# Patient Record
Sex: FEMALE | Race: WHITE
[De-identification: names, ages, dates, MRNs, and addresses within clinical notes are randomized per-mention and may not be internally consistent; named-entity substitution may affect disease eponyms.]

---

## 2021-01-21 PROBLEM — Z00.00 ENCOUNTER FOR PREVENTIVE HEALTH EXAMINATION: Status: ACTIVE | Noted: 2021-01-21

## 2021-01-22 ENCOUNTER — APPOINTMENT (OUTPATIENT)
Dept: ORTHOPEDIC SURGERY | Facility: CLINIC | Age: 26
End: 2021-01-22
Payer: COMMERCIAL

## 2021-01-22 VITALS
SYSTOLIC BLOOD PRESSURE: 119 MMHG | BODY MASS INDEX: 22.5 KG/M2 | HEIGHT: 63 IN | WEIGHT: 127 LBS | DIASTOLIC BLOOD PRESSURE: 77 MMHG | HEART RATE: 82 BPM

## 2021-01-22 DIAGNOSIS — Z78.9 OTHER SPECIFIED HEALTH STATUS: ICD-10-CM

## 2021-01-22 DIAGNOSIS — R29.898 OTHER SYMPTOMS AND SIGNS INVOLVING THE MUSCULOSKELETAL SYSTEM: ICD-10-CM

## 2021-01-22 DIAGNOSIS — M77.11 LATERAL EPICONDYLITIS, RIGHT ELBOW: ICD-10-CM

## 2021-01-22 PROCEDURE — 99204 OFFICE O/P NEW MOD 45 MIN: CPT

## 2021-01-22 PROCEDURE — 99072 ADDL SUPL MATRL&STAF TM PHE: CPT

## 2021-01-22 PROCEDURE — 73130 X-RAY EXAM OF HAND: CPT | Mod: RT

## 2021-01-22 PROCEDURE — 73080 X-RAY EXAM OF ELBOW: CPT | Mod: RT

## 2021-01-22 RX ORDER — CYCLOBENZAPRINE HYDROCHLORIDE 7.5 MG/1
TABLET, FILM COATED ORAL
Refills: 0 | Status: ACTIVE | COMMUNITY

## 2021-01-22 RX ORDER — MELOXICAM 15 MG/1
15 TABLET ORAL
Qty: 14 | Refills: 1 | Status: ACTIVE | COMMUNITY
Start: 2021-01-22 | End: 1900-01-01

## 2021-01-22 NOTE — PHYSICAL EXAM
[de-identified] : Right elbow exam\par \par Skin: Clean, dry, intact. No ecchymosis. No swelling. No palpable joint effusion.\par ROM: RIGHT full flexion and extension, full supination/pronation.  LEFT full flexion and extension full supination/pronation.\par Painful ROM: None\par Tenderness: No medial epicondyle pain. Moderate lateral epicondyle pain. No olecranon pain. No pain at radial head.\par Strength: 5/5 elbow flexion, 5/5 elbow extension, 5/5 supination, 5/5 pronation\par Stability: Stable to vaus/valgus stress\par Vasc: 2+ radial pulse, <2s cap refill\par Sensation: In tact to light touch throughout\par Neuro: Negative tinels at ulnar canal, AIN/PIN/Ulnar nerve in tact to motor/sensation.\par \par Right hand exam\par \par Skin: Clean, dry, intact. No ecchymosis. No swelling. No palpable deformity. No crepitus\par ROM: Normal range of motion of all digits. Normal cascade/no rotational deformity.\par Painful ROM: None\par Tenderness: No pain at the scapholunate interval. No snuffbox pain. No ttp at the distal radius, distal ulna, or the DRUJ.  Mild tenderness at the A1 pulley of the right index finger.\par Strength: 5/5  5/5 wrist flexion, 5/5 wrist extension, 5/5 supination, 5/5 pronation\par Stability: No instability\par Vasc: 2+ radial pulse, <2s cap refill throughout\par Sensation: In tact to light touch throughout\par Neuro: Negative tinels over median nerve, AIN/PIN/Ulnar nerve in tact to motor/sensation.\par \par  [de-identified] : 3 views of the right hand taken today reviewed, are within normal, 3 views of the right elbow taken today reviewed with the patient detail, reveal no bony abnormality, no fractures. limits with no bony abnormalities, no fractures.

## 2021-01-22 NOTE — ASSESSMENT
[FreeTextEntry1] : 25-year-old female with pain at the dorsoradial aspect of the right forearm as well as at the A1 pulley of the right index finger and base of the right thumb. Imaging and exam today her arm benign, her symptoms are mostly related to activity consistent with the forearm strain/mild hand tendinitis. I recommend conservative treatment at this time with a course of physical therapy as well as anti-inflammatory medication. She will follow up in 4 weeks if she's not begin to see improvement in her symptoms.

## 2021-01-22 NOTE — HISTORY OF PRESENT ILLNESS
[FreeTextEntry1] : 25 year old female presents for evaluation of right hand and right elbow pain present for about 2 years. She denies any specific injury.  She notes she initially presented with a tremor in her right finger, and was treated conservatively by a chiropractor. She notes after manipulation over one year ago, she developed worsening pain localized to the lateral aspect of her elbow with radiation to the forearm. he also reports some radiation of pain to the operating arm, and describes this as nerve pain. She also notes worsening pain along the A1 pulley of her right index finger. She denies triggering. She notes she has been taking cyclobenzaprine, as she was told the symptoms were related to her neck initially, and notes some improvement with use of the medication. She knows she has difficulty and pain with any repetitive activity, with relief of her symptoms with rest.

## 2021-02-08 ENCOUNTER — APPOINTMENT (OUTPATIENT)
Dept: PHYSICAL MEDICINE AND REHAB | Facility: CLINIC | Age: 26
End: 2021-02-08
Payer: COMMERCIAL

## 2021-02-08 VITALS
HEIGHT: 63 IN | HEART RATE: 108 BPM | DIASTOLIC BLOOD PRESSURE: 82 MMHG | WEIGHT: 127 LBS | BODY MASS INDEX: 22.5 KG/M2 | SYSTOLIC BLOOD PRESSURE: 121 MMHG | TEMPERATURE: 97.2 F

## 2021-02-08 DIAGNOSIS — M79.641 PAIN IN RIGHT HAND: ICD-10-CM

## 2021-02-08 PROCEDURE — 95885 MUSC TST DONE W/NERV TST LIM: CPT | Mod: RT

## 2021-02-08 PROCEDURE — 95908 NRV CNDJ TST 3-4 STUDIES: CPT

## 2021-02-08 PROCEDURE — 99072 ADDL SUPL MATRL&STAF TM PHE: CPT

## 2021-02-24 ENCOUNTER — APPOINTMENT (OUTPATIENT)
Dept: PHYSICAL MEDICINE AND REHAB | Facility: CLINIC | Age: 26
End: 2021-02-24

## 2021-02-24 VITALS
SYSTOLIC BLOOD PRESSURE: 125 MMHG | BODY MASS INDEX: 22.5 KG/M2 | HEIGHT: 63 IN | DIASTOLIC BLOOD PRESSURE: 86 MMHG | TEMPERATURE: 98.2 F | WEIGHT: 127 LBS | HEART RATE: 99 BPM

## 2021-03-08 ENCOUNTER — APPOINTMENT (OUTPATIENT)
Dept: PHYSICAL MEDICINE AND REHAB | Facility: CLINIC | Age: 26
End: 2021-03-08
Payer: COMMERCIAL

## 2021-03-08 DIAGNOSIS — M77.01 MEDIAL EPICONDYLITIS, RIGHT ELBOW: ICD-10-CM

## 2021-03-08 DIAGNOSIS — G56.01 CARPAL TUNNEL SYNDROME, RIGHT UPPER LIMB: ICD-10-CM

## 2021-03-08 PROCEDURE — 99072 ADDL SUPL MATRL&STAF TM PHE: CPT

## 2021-03-08 PROCEDURE — 76882 US LMTD JT/FCL EVL NVASC XTR: CPT | Mod: RT
